# Patient Record
Sex: FEMALE | Race: WHITE | NOT HISPANIC OR LATINO | Employment: OTHER | URBAN - METROPOLITAN AREA
[De-identification: names, ages, dates, MRNs, and addresses within clinical notes are randomized per-mention and may not be internally consistent; named-entity substitution may affect disease eponyms.]

---

## 2018-03-02 ENCOUNTER — NEW REFERRAL (OUTPATIENT)
Dept: URBAN - METROPOLITAN AREA CLINIC 26 | Facility: CLINIC | Age: 78
End: 2018-03-02

## 2018-03-02 VITALS
WEIGHT: 165 LBS | HEIGHT: 68.5 IN | BODY MASS INDEX: 24.72 KG/M2 | DIASTOLIC BLOOD PRESSURE: 75 MMHG | HEART RATE: 70 BPM | SYSTOLIC BLOOD PRESSURE: 111 MMHG

## 2018-03-02 DIAGNOSIS — H35.371: ICD-10-CM

## 2018-03-02 DIAGNOSIS — H35.3132: ICD-10-CM

## 2018-03-02 DIAGNOSIS — H35.373: ICD-10-CM

## 2018-03-02 DIAGNOSIS — H40.1130: ICD-10-CM

## 2018-03-02 DIAGNOSIS — H02.056: ICD-10-CM

## 2018-03-02 PROCEDURE — 67820 REVISE EYELASHES: CPT

## 2018-03-02 PROCEDURE — 92235 FLUORESCEIN ANGRPH MLTIFRAME: CPT

## 2018-03-02 PROCEDURE — 92134 CPTRZ OPH DX IMG PST SGM RTA: CPT

## 2018-03-02 PROCEDURE — 99204 OFFICE O/P NEW MOD 45 MIN: CPT

## 2018-03-02 PROCEDURE — 92250 FUNDUS PHOTOGRAPHY W/I&R: CPT

## 2018-03-02 ASSESSMENT — TONOMETRY
OD_IOP_MMHG: 17
OS_IOP_MMHG: 16

## 2018-03-02 ASSESSMENT — VISUAL ACUITY
OS_CC: 20/30+1
OD_CC: 20/20-1

## 2018-12-18 ENCOUNTER — FOLLOW UP (OUTPATIENT)
Dept: URBAN - METROPOLITAN AREA CLINIC 26 | Facility: CLINIC | Age: 78
End: 2018-12-18

## 2018-12-18 VITALS — HEART RATE: 73 BPM | HEIGHT: 55 IN | SYSTOLIC BLOOD PRESSURE: 119 MMHG | DIASTOLIC BLOOD PRESSURE: 76 MMHG

## 2018-12-18 DIAGNOSIS — H35.373: ICD-10-CM

## 2018-12-18 DIAGNOSIS — H35.371: ICD-10-CM

## 2018-12-18 DIAGNOSIS — H40.1130: ICD-10-CM

## 2018-12-18 DIAGNOSIS — H35.3132: ICD-10-CM

## 2018-12-18 DIAGNOSIS — H02.056: ICD-10-CM

## 2018-12-18 PROCEDURE — 92014 COMPRE OPH EXAM EST PT 1/>: CPT

## 2018-12-18 PROCEDURE — 92250 FUNDUS PHOTOGRAPHY W/I&R: CPT

## 2018-12-18 PROCEDURE — 92235 FLUORESCEIN ANGRPH MLTIFRAME: CPT

## 2018-12-18 ASSESSMENT — TONOMETRY
OD_IOP_MMHG: 10
OS_IOP_MMHG: 10

## 2018-12-18 ASSESSMENT — VISUAL ACUITY
OS_CC: 20/25-1
OD_CC: 20/15

## 2019-12-17 ENCOUNTER — FOLLOW UP (OUTPATIENT)
Dept: URBAN - METROPOLITAN AREA CLINIC 26 | Facility: CLINIC | Age: 79
End: 2019-12-17

## 2019-12-17 VITALS
HEIGHT: 68.5 IN | DIASTOLIC BLOOD PRESSURE: 66 MMHG | SYSTOLIC BLOOD PRESSURE: 108 MMHG | BODY MASS INDEX: 23.97 KG/M2 | HEART RATE: 62 BPM | WEIGHT: 160 LBS

## 2019-12-17 DIAGNOSIS — H40.1130: ICD-10-CM

## 2019-12-17 DIAGNOSIS — H35.373: ICD-10-CM

## 2019-12-17 DIAGNOSIS — H35.3132: ICD-10-CM

## 2019-12-17 DIAGNOSIS — H02.056: ICD-10-CM

## 2019-12-17 DIAGNOSIS — H35.371: ICD-10-CM

## 2019-12-17 PROCEDURE — 92235 FLUORESCEIN ANGRPH MLTIFRAME: CPT

## 2019-12-17 PROCEDURE — 92014 COMPRE OPH EXAM EST PT 1/>: CPT

## 2019-12-17 PROCEDURE — 92250 FUNDUS PHOTOGRAPHY W/I&R: CPT

## 2019-12-17 ASSESSMENT — TONOMETRY
OS_IOP_MMHG: 12
OD_IOP_MMHG: 10

## 2019-12-17 ASSESSMENT — VISUAL ACUITY
OD_CC: 20/20-1
OS_CC: 20/30+1

## 2020-12-15 ENCOUNTER — FOLLOW UP (OUTPATIENT)
Dept: URBAN - METROPOLITAN AREA CLINIC 26 | Facility: CLINIC | Age: 80
End: 2020-12-15

## 2020-12-15 VITALS
DIASTOLIC BLOOD PRESSURE: 75 MMHG | HEART RATE: 70 BPM | BODY MASS INDEX: 24.72 KG/M2 | HEIGHT: 68.5 IN | WEIGHT: 165 LBS | SYSTOLIC BLOOD PRESSURE: 113 MMHG

## 2020-12-15 DIAGNOSIS — H35.3132: ICD-10-CM

## 2020-12-15 DIAGNOSIS — H02.056: ICD-10-CM

## 2020-12-15 DIAGNOSIS — H35.371: ICD-10-CM

## 2020-12-15 DIAGNOSIS — H40.1130: ICD-10-CM

## 2020-12-15 DIAGNOSIS — H35.373: ICD-10-CM

## 2020-12-15 PROCEDURE — 92235 FLUORESCEIN ANGRPH MLTIFRAME: CPT

## 2020-12-15 PROCEDURE — 92014 COMPRE OPH EXAM EST PT 1/>: CPT

## 2020-12-15 PROCEDURE — 92250 FUNDUS PHOTOGRAPHY W/I&R: CPT

## 2020-12-15 ASSESSMENT — TONOMETRY
OS_IOP_MMHG: 14
OD_IOP_MMHG: 16

## 2020-12-15 ASSESSMENT — VISUAL ACUITY
OS_CC: 20/30-2
OD_CC: 20/20-1

## 2021-01-01 ENCOUNTER — OFFICE VISIT (OUTPATIENT)
Age: 81
End: 2021-01-01

## 2021-01-20 ENCOUNTER — OFFICE VISIT (OUTPATIENT)
Dept: URBAN - METROPOLITAN AREA CLINIC 9 | Facility: CLINIC | Age: 81
End: 2021-01-20

## 2021-01-28 ENCOUNTER — OFFICE VISIT (OUTPATIENT)
Dept: URBAN - METROPOLITAN AREA SURGERY CENTER 9 | Facility: SURGERY CENTER | Age: 81
End: 2021-01-28

## 2021-02-01 ENCOUNTER — OFFICE VISIT (OUTPATIENT)
Dept: URBAN - METROPOLITAN AREA CLINIC 9 | Facility: CLINIC | Age: 81
End: 2021-02-01

## 2021-02-04 ENCOUNTER — TELEPHONE ENCOUNTER (OUTPATIENT)
Dept: URBAN - METROPOLITAN AREA CLINIC 9 | Facility: CLINIC | Age: 81
End: 2021-02-04

## 2021-02-12 ENCOUNTER — OFFICE VISIT (OUTPATIENT)
Dept: URBAN - METROPOLITAN AREA CLINIC 9 | Facility: CLINIC | Age: 81
End: 2021-02-12

## 2021-02-18 ENCOUNTER — OFFICE VISIT (OUTPATIENT)
Dept: URBAN - METROPOLITAN AREA CLINIC 9 | Facility: CLINIC | Age: 81
End: 2021-02-18

## 2021-02-23 ENCOUNTER — OFFICE VISIT (OUTPATIENT)
Dept: URBAN - METROPOLITAN AREA CLINIC 9 | Facility: CLINIC | Age: 81
End: 2021-02-23

## 2021-03-30 ENCOUNTER — OFFICE VISIT (OUTPATIENT)
Dept: URBAN - METROPOLITAN AREA CLINIC 9 | Facility: CLINIC | Age: 81
End: 2021-03-30

## 2021-04-01 ENCOUNTER — TELEPHONE ENCOUNTER (OUTPATIENT)
Dept: URBAN - METROPOLITAN AREA CLINIC 9 | Facility: CLINIC | Age: 81
End: 2021-04-01

## 2021-04-02 ENCOUNTER — LAB OUTSIDE AN ENCOUNTER (OUTPATIENT)
Age: 81
End: 2021-04-02

## 2021-04-02 ENCOUNTER — TELEPHONE ENCOUNTER (OUTPATIENT)
Dept: URBAN - METROPOLITAN AREA CLINIC 9 | Facility: CLINIC | Age: 81
End: 2021-04-02

## 2021-04-05 ENCOUNTER — TELEPHONE ENCOUNTER (OUTPATIENT)
Dept: URBAN - METROPOLITAN AREA CLINIC 9 | Facility: CLINIC | Age: 81
End: 2021-04-05

## 2021-04-07 LAB — C DIFFICILE TOXIN GENE NAA: NEGATIVE

## 2021-04-12 ENCOUNTER — TELEPHONE ENCOUNTER (OUTPATIENT)
Dept: URBAN - METROPOLITAN AREA CLINIC 9 | Facility: CLINIC | Age: 81
End: 2021-04-12

## 2021-04-22 ENCOUNTER — OFFICE VISIT (OUTPATIENT)
Dept: URBAN - METROPOLITAN AREA CLINIC 9 | Facility: CLINIC | Age: 81
End: 2021-04-22

## 2021-04-26 ENCOUNTER — OFFICE VISIT (OUTPATIENT)
Dept: URBAN - METROPOLITAN AREA CLINIC 9 | Facility: CLINIC | Age: 81
End: 2021-04-26

## 2021-04-28 ENCOUNTER — OFFICE VISIT (OUTPATIENT)
Dept: URBAN - METROPOLITAN AREA CLINIC 9 | Facility: CLINIC | Age: 81
End: 2021-04-28

## 2021-04-28 ENCOUNTER — TELEPHONE ENCOUNTER (OUTPATIENT)
Dept: URBAN - METROPOLITAN AREA CLINIC 9 | Facility: CLINIC | Age: 81
End: 2021-04-28

## 2021-04-30 ENCOUNTER — TELEPHONE ENCOUNTER (OUTPATIENT)
Dept: URBAN - METROPOLITAN AREA CLINIC 9 | Facility: CLINIC | Age: 81
End: 2021-04-30

## 2021-12-17 ENCOUNTER — FOLLOW UP (OUTPATIENT)
Dept: URBAN - METROPOLITAN AREA CLINIC 26 | Facility: CLINIC | Age: 81
End: 2021-12-17

## 2021-12-17 VITALS
HEIGHT: 68 IN | SYSTOLIC BLOOD PRESSURE: 107 MMHG | HEART RATE: 71 BPM | BODY MASS INDEX: 24.25 KG/M2 | DIASTOLIC BLOOD PRESSURE: 74 MMHG | WEIGHT: 160 LBS

## 2021-12-17 DIAGNOSIS — H04.123: ICD-10-CM

## 2021-12-17 DIAGNOSIS — H35.371: ICD-10-CM

## 2021-12-17 DIAGNOSIS — H35.372: ICD-10-CM

## 2021-12-17 DIAGNOSIS — H40.1130: ICD-10-CM

## 2021-12-17 DIAGNOSIS — H02.056: ICD-10-CM

## 2021-12-17 DIAGNOSIS — H35.3132: ICD-10-CM

## 2021-12-17 PROCEDURE — 92235 FLUORESCEIN ANGRPH MLTIFRAME: CPT

## 2021-12-17 PROCEDURE — 92014 COMPRE OPH EXAM EST PT 1/>: CPT

## 2021-12-17 PROCEDURE — 92134 CPTRZ OPH DX IMG PST SGM RTA: CPT

## 2021-12-17 ASSESSMENT — TONOMETRY
OS_IOP_MMHG: 10
OD_IOP_MMHG: 12

## 2021-12-17 ASSESSMENT — VISUAL ACUITY
OD_CC: 20/20-2
OS_CC: 20/30+2

## 2022-07-30 ENCOUNTER — TELEPHONE ENCOUNTER (OUTPATIENT)
Age: 82
End: 2022-07-30

## 2022-07-30 RX ORDER — SUCRALFATE 1 G/1
4 TABLET ORAL
Qty: 0 | Refills: 2 | OUTPATIENT
Start: 2021-02-18 | End: 2021-03-20

## 2022-07-30 RX ORDER — PANTOPRAZOLE SODIUM 40 MG/1
1 (ONE) TABLET, DELAYED RELEASE ORAL
Qty: 0 | Refills: 2 | OUTPATIENT
Start: 2021-02-18 | End: 2021-03-20

## 2022-07-30 RX ORDER — METRONIDAZOLE 500 MG/1
1 (ONE) TABLET ORAL
Qty: 0 | Refills: 2 | OUTPATIENT
Start: 2021-04-05 | End: 2021-04-12

## 2022-07-30 RX ORDER — FAMOTIDINE 20 MG/1
1-2 TABLET ORAL
Qty: 0 | Refills: 16 | OUTPATIENT
Start: 2020-01-31 | End: 2021-01-20

## 2022-07-30 RX ORDER — OMEPRAZOLE 10 MG/1
1 CAPSULE, DELAYED RELEASE ORAL DAILY
Qty: 0 | Refills: 2 | OUTPATIENT
Start: 2019-02-19 | End: 2019-02-19

## 2022-07-30 RX ORDER — SUCRALFATE 1 G/1
4 TABLET ORAL
Qty: 0 | Refills: 2 | OUTPATIENT
Start: 2021-03-30 | End: 2021-04-29

## 2022-07-30 RX ORDER — WHEAT DEXTRIN 3 G/4 G
1 (ONE) POWDER (GRAM) ORAL DAILY
Qty: 0 | Refills: 2 | OUTPATIENT
Start: 2020-01-31 | End: 2020-03-01

## 2022-07-30 RX ORDER — FAMOTIDINE 20 MG/1
1 (ONE) TABLET ORAL EVERY EVENING
Qty: 0 | Refills: 16 | OUTPATIENT
Start: 2021-02-18 | End: 2021-04-02

## 2022-07-30 RX ORDER — PANTOPRAZOLE SODIUM 40 MG/1
1 (ONE) TABLET, DELAYED RELEASE ORAL
Qty: 0 | Refills: 2 | OUTPATIENT
Start: 2021-03-30 | End: 2021-03-30

## 2022-07-31 ENCOUNTER — TELEPHONE ENCOUNTER (OUTPATIENT)
Age: 82
End: 2022-07-31

## 2022-07-31 RX ORDER — LORATADINE 5 MG
17 GRAMS TABLET,CHEWABLE ORAL DAILY
Qty: 0 | Refills: 16 | Status: ACTIVE | COMMUNITY
Start: 2020-01-31

## 2022-07-31 RX ORDER — SUCRALFATE 1 G/1
4 TABLET ORAL
Qty: 0 | Refills: 2 | Status: ACTIVE | COMMUNITY
Start: 2021-01-20

## 2022-07-31 RX ORDER — OMEPRAZOLE 10 MG/1
1 CAPSULE, DELAYED RELEASE ORAL DAILY
Qty: 0 | Refills: 2 | Status: ACTIVE | COMMUNITY
Start: 2019-01-30

## 2022-07-31 RX ORDER — METRONIDAZOLE 500 MG/1
1 (ONE) TABLET ORAL
Qty: 0 | Refills: 2 | Status: ACTIVE | COMMUNITY
Start: 2021-04-02

## 2022-07-31 RX ORDER — FAMOTIDINE 40 MG/1
AS TABLET, FILM COATED ORAL
Qty: 0 | Refills: 16 | Status: ACTIVE | COMMUNITY
Start: 2021-04-28

## 2022-07-31 RX ORDER — METRONIDAZOLE 500 MG/1
1 (ONE) TABLET ORAL
Qty: 0 | Refills: 2 | Status: ACTIVE | COMMUNITY
Start: 2021-04-05

## 2022-07-31 RX ORDER — SUCRALFATE 1 G/1
4 TABLET ORAL
Qty: 0 | Refills: 2 | Status: ACTIVE | COMMUNITY
Start: 2021-03-30

## 2022-07-31 RX ORDER — PANTOPRAZOLE SODIUM 40 MG/1
1 (ONE) TABLET, DELAYED RELEASE ORAL
Qty: 0 | Refills: 2 | Status: ACTIVE | COMMUNITY
Start: 2021-02-18

## 2022-07-31 RX ORDER — WHEAT DEXTRIN 3 G/4 G
1 (ONE) POWDER (GRAM) ORAL DAILY
Qty: 0 | Refills: 2 | Status: ACTIVE | COMMUNITY
Start: 2020-01-31

## 2022-07-31 RX ORDER — LORATADINE 5 MG
17 GRAMS TABLET,CHEWABLE ORAL DAILY
Qty: 0 | Refills: 16 | Status: ACTIVE | COMMUNITY
Start: 2021-01-20

## 2022-07-31 RX ORDER — SUCRALFATE 1 G/1
4 TABLET ORAL
Qty: 0 | Refills: 2 | Status: ACTIVE | COMMUNITY
Start: 2021-02-18

## 2022-07-31 RX ORDER — LORATADINE 5 MG
17 GRAMS TABLET,CHEWABLE ORAL DAILY
Qty: 0 | Refills: 16 | Status: ACTIVE | COMMUNITY
Start: 2021-04-28

## 2022-07-31 RX ORDER — PANTOPRAZOLE SODIUM 40 MG/1
1 (ONE) TABLET, DELAYED RELEASE ORAL
Qty: 0 | Refills: 2 | Status: ACTIVE | COMMUNITY
Start: 2021-01-20

## 2022-07-31 RX ORDER — FAMOTIDINE 20 MG/1
1 (ONE) TABLET ORAL EVERY EVENING
Qty: 0 | Refills: 16 | Status: ACTIVE | COMMUNITY
Start: 2021-02-18

## 2022-07-31 RX ORDER — FAMOTIDINE 20 MG/1
1-2 TABLET ORAL
Qty: 0 | Refills: 16 | Status: ACTIVE | COMMUNITY
Start: 2020-01-31

## 2022-07-31 RX ORDER — FAMOTIDINE 20 MG/1
1-2 TABLET ORAL
Qty: 0 | Refills: 16 | Status: ACTIVE | COMMUNITY
Start: 2019-02-19

## 2022-07-31 RX ORDER — LORATADINE 5 MG
17 GRAMS TABLET,CHEWABLE ORAL DAILY
Qty: 0 | Refills: 16 | Status: ACTIVE | COMMUNITY
Start: 2021-02-18

## 2022-07-31 RX ORDER — FAMOTIDINE 40 MG/1
1 (ONE) TABLET, FILM COATED ORAL TWICE A DAY
Qty: 0 | Refills: 16 | Status: ACTIVE | COMMUNITY
Start: 2021-04-05

## 2022-07-31 RX ORDER — FAMOTIDINE 40 MG/1
1 (ONE) TABLET, FILM COATED ORAL TWICE A DAY
Qty: 0 | Refills: 16 | Status: ACTIVE | COMMUNITY
Start: 2021-03-30

## 2022-12-09 ENCOUNTER — FOLLOW UP (OUTPATIENT)
Dept: URBAN - METROPOLITAN AREA CLINIC 26 | Facility: CLINIC | Age: 82
End: 2022-12-09

## 2022-12-09 VITALS
HEART RATE: 72 BPM | HEIGHT: 68 IN | WEIGHT: 160 LBS | DIASTOLIC BLOOD PRESSURE: 70 MMHG | SYSTOLIC BLOOD PRESSURE: 100 MMHG | BODY MASS INDEX: 24.25 KG/M2

## 2022-12-09 PROCEDURE — 92014 COMPRE OPH EXAM EST PT 1/>: CPT

## 2022-12-09 PROCEDURE — 92250 FUNDUS PHOTOGRAPHY W/I&R: CPT

## 2022-12-09 PROCEDURE — 92235 FLUORESCEIN ANGRPH MLTIFRAME: CPT

## 2022-12-09 PROCEDURE — 92134 CPTRZ OPH DX IMG PST SGM RTA: CPT

## 2022-12-09 ASSESSMENT — VISUAL ACUITY
OS_CC: 20/40-2
OD_CC: 20/25+2

## 2022-12-09 ASSESSMENT — TONOMETRY
OD_IOP_MMHG: 12
OS_IOP_MMHG: 10

## 2023-04-04 ENCOUNTER — WEB ENCOUNTER (OUTPATIENT)
Dept: URBAN - METROPOLITAN AREA CLINIC 9 | Facility: CLINIC | Age: 83
End: 2023-04-04

## 2023-04-04 ENCOUNTER — OFFICE VISIT (OUTPATIENT)
Dept: URBAN - METROPOLITAN AREA CLINIC 9 | Facility: CLINIC | Age: 83
End: 2023-04-04
Payer: MEDICARE

## 2023-04-04 VITALS
HEIGHT: 68 IN | BODY MASS INDEX: 25.46 KG/M2 | DIASTOLIC BLOOD PRESSURE: 76 MMHG | WEIGHT: 168 LBS | SYSTOLIC BLOOD PRESSURE: 124 MMHG

## 2023-04-04 DIAGNOSIS — K21.9 GASTROESOPHAGEAL REFLUX DISEASE WITHOUT ESOPHAGITIS: ICD-10-CM

## 2023-04-04 DIAGNOSIS — K58.1 IRRITABLE BOWEL SYNDROME WITH CONSTIPATION: ICD-10-CM

## 2023-04-04 DIAGNOSIS — R19.4 CHANGE IN BOWEL HABIT: ICD-10-CM

## 2023-04-04 PROCEDURE — 99214 OFFICE O/P EST MOD 30 MIN: CPT | Performed by: INTERNAL MEDICINE

## 2023-04-04 RX ORDER — PANTOPRAZOLE SODIUM 40 MG/1
1 (ONE) TABLET, DELAYED RELEASE ORAL
Qty: 0 | Refills: 2 | Status: ACTIVE | COMMUNITY
Start: 2021-02-18

## 2023-04-04 RX ORDER — SUCRALFATE 1 G/1
4 TABLET ORAL
Qty: 0 | Refills: 2 | Status: ON HOLD | COMMUNITY
Start: 2021-03-30

## 2023-04-04 RX ORDER — DORZOLAMIDE HCL 20 MG/ML
1 DROP INTO AFFECTED EYE SOLUTION/ DROPS OPHTHALMIC THREE TIMES A DAY
Status: ACTIVE | COMMUNITY

## 2023-04-04 RX ORDER — WHEAT DEXTRIN 3 G/4 G
1 TABLET POWDER (GRAM) ORAL TWICE A DAY
Qty: 60 TABLET | Refills: 3 | OUTPATIENT
Start: 2020-01-31 | End: 2023-08-02

## 2023-04-04 RX ORDER — FAMOTIDINE 20 MG/1
1-2 TABLET ORAL
Qty: 0 | Refills: 16 | Status: ON HOLD | COMMUNITY
Start: 2019-02-19

## 2023-04-04 RX ORDER — WHEAT DEXTRIN 3 G/4 G
1 (ONE) POWDER (GRAM) ORAL DAILY
Qty: 0 | Refills: 2 | Status: ON HOLD | COMMUNITY
Start: 2020-01-31

## 2023-04-04 RX ORDER — PANTOPRAZOLE SODIUM 40 MG/1
1 (ONE) TABLET, DELAYED RELEASE ORAL
OUTPATIENT
Start: 2021-02-18

## 2023-04-04 RX ORDER — LORATADINE 5 MG
17 GRAMS TABLET,CHEWABLE ORAL DAILY
Qty: 0 | Refills: 16 | Status: ON HOLD | COMMUNITY
Start: 2021-01-20

## 2023-04-04 RX ORDER — METRONIDAZOLE 500 MG/1
1 (ONE) TABLET ORAL
Qty: 0 | Refills: 2 | Status: ON HOLD | COMMUNITY
Start: 2021-04-05

## 2023-04-04 RX ORDER — OMEPRAZOLE 10 MG/1
1 CAPSULE, DELAYED RELEASE ORAL DAILY
Qty: 0 | Refills: 2 | Status: ON HOLD | COMMUNITY
Start: 2019-01-30

## 2023-04-04 RX ORDER — FAMOTIDINE 40 MG/1
1 (ONE) TABLET, FILM COATED ORAL TWICE A DAY
Qty: 0 | Refills: 16 | Status: ON HOLD | COMMUNITY
Start: 2021-03-30

## 2023-04-04 NOTE — HPI-TODAY'S VISIT:
Pt here for eval of new onset FI and bowel changes for the past year that are new. . Pt EGD 15 years ago with hiatal hernia. EGD 1/2019 with HP neg gastritis EGD 2/2021 with Hp neg gastritis, no hernia/ulcer/cancer . last colonoscopy clear in 2015, pt avg risk . 3/2023 CBC, BMP, lfts normal . Failed pepcid Failed omeprazole 20 mg OTC Did well on protonix every Am and pepcid QHS . Pt here for evaluation of new bowel changes. She hasnt had a colonoscopy since 2015. She is on benefiber and still having FI and new onset bowel changes despite fiber. She has not had a colonoscopy and I advised we plan on colon as it has been 8 years, there are bowel changes and plan on bx. Cont benefiber for now but increase to BID.  . RTC 2 months.

## 2023-10-31 ENCOUNTER — TELEPHONE ENCOUNTER (OUTPATIENT)
Dept: URBAN - METROPOLITAN AREA CLINIC 7 | Facility: CLINIC | Age: 83
End: 2023-10-31

## 2024-01-16 ENCOUNTER — OFFICE VISIT (OUTPATIENT)
Dept: URBAN - METROPOLITAN AREA CLINIC 9 | Facility: CLINIC | Age: 84
End: 2024-01-16
Payer: MEDICARE

## 2024-01-16 ENCOUNTER — DASHBOARD ENCOUNTERS (OUTPATIENT)
Age: 84
End: 2024-01-16

## 2024-01-16 VITALS
DIASTOLIC BLOOD PRESSURE: 84 MMHG | HEIGHT: 68 IN | BODY MASS INDEX: 26.07 KG/M2 | WEIGHT: 172 LBS | SYSTOLIC BLOOD PRESSURE: 132 MMHG

## 2024-01-16 DIAGNOSIS — R19.4 CHANGE IN BOWEL HABIT: ICD-10-CM

## 2024-01-16 DIAGNOSIS — K21.9 GASTROESOPHAGEAL REFLUX DISEASE WITHOUT ESOPHAGITIS: ICD-10-CM

## 2024-01-16 PROCEDURE — 99214 OFFICE O/P EST MOD 30 MIN: CPT | Performed by: INTERNAL MEDICINE

## 2024-01-16 RX ORDER — FAMOTIDINE 20 MG/1
1-2 TABLET ORAL
Qty: 0 | Refills: 16 | Status: ON HOLD | COMMUNITY
Start: 2019-02-19

## 2024-01-16 RX ORDER — WHEAT DEXTRIN 3 G/4 G
1 TABLET POWDER (GRAM) ORAL TWICE A DAY
OUTPATIENT
Start: 2020-01-31

## 2024-01-16 RX ORDER — OMEPRAZOLE 10 MG/1
1 CAPSULE, DELAYED RELEASE ORAL DAILY
Qty: 0 | Refills: 2 | Status: ON HOLD | COMMUNITY
Start: 2019-01-30

## 2024-01-16 RX ORDER — PANTOPRAZOLE SODIUM 40 MG/1
1 (ONE) TABLET, DELAYED RELEASE ORAL
Status: ACTIVE | COMMUNITY
Start: 2021-02-18

## 2024-01-16 RX ORDER — DORZOLAMIDE HCL 20 MG/ML
1 DROP INTO AFFECTED EYE SOLUTION/ DROPS OPHTHALMIC THREE TIMES A DAY
Status: ACTIVE | COMMUNITY

## 2024-01-16 RX ORDER — FAMOTIDINE 40 MG/1
1 (ONE) TABLET, FILM COATED ORAL TWICE A DAY
Qty: 0 | Refills: 16 | Status: ON HOLD | COMMUNITY
Start: 2021-03-30

## 2024-01-16 RX ORDER — LORATADINE 5 MG
17 GRAMS TABLET,CHEWABLE ORAL DAILY
Qty: 0 | Refills: 16 | Status: ON HOLD | COMMUNITY
Start: 2021-01-20

## 2024-01-16 RX ORDER — METRONIDAZOLE 500 MG/1
1 (ONE) TABLET ORAL
Qty: 0 | Refills: 2 | Status: ON HOLD | COMMUNITY
Start: 2021-04-05

## 2024-01-16 RX ORDER — PANTOPRAZOLE SODIUM 40 MG/1
1 (ONE) TABLET, DELAYED RELEASE ORAL
OUTPATIENT
Start: 2021-02-18

## 2024-01-16 RX ORDER — SUCRALFATE 1 G/1
4 TABLET ORAL
Qty: 0 | Refills: 2 | Status: ON HOLD | COMMUNITY
Start: 2021-03-30

## 2024-01-16 NOTE — HPI-TODAY'S VISIT:
Pt here for f/u of diarrhea and fecal incontinence . Pt EGD 15 years ago with hiatal hernia. last colonoscopy clear in 2015, pt avg risk EGD 1/2019 with HP neg gastritis EGD 2/2021 with Hp neg gastritis, no hernia/ulcer/cancer . 3/2023 CBC, BMP, lfts normal . Failed pepcid Failed omeprazole 20 mg OTC Did well on protonix every Am and pepcid QHS . Pt here for f/u. We last saw her in 3/2023 and we increased fiber to bid and advised colonoscopy. She never scheduled the colon as she was improved. She now has relapsed but had reduced her fiber to once per day. She now has increased to BID in the past week and is partially improved. No bleeding, no weight loss. We again discussed a colon and the r/b/a. She will consider a colon and will schedule. She again understands the risks of colon cancer or a polyp or colitis. .

## 2024-02-14 ENCOUNTER — OV EP (OUTPATIENT)
Dept: URBAN - METROPOLITAN AREA CLINIC 9 | Facility: CLINIC | Age: 84
End: 2024-02-14

## 2024-03-04 ENCOUNTER — COLON (OUTPATIENT)
Dept: URBAN - METROPOLITAN AREA SURGERY CENTER 9 | Facility: SURGERY CENTER | Age: 84
End: 2024-03-04
Payer: MEDICARE

## 2024-03-04 DIAGNOSIS — Z53.8 PROCEDURE AND TREATMENT NOT CARRIED OUT FOR OTHER REASONS: ICD-10-CM

## 2024-03-04 DIAGNOSIS — K64.8 OTHER HEMORRHOIDS: ICD-10-CM

## 2024-03-04 DIAGNOSIS — K57.30 DIVERTICULOSIS OF LARGE INTESTINE WITHOUT PERFORATION OR ABSCESS WITHOUT BLEEDING: ICD-10-CM

## 2024-03-04 DIAGNOSIS — K56.699 COLON STRICTURE: ICD-10-CM

## 2024-03-04 PROCEDURE — 45378 DIAGNOSTIC COLONOSCOPY: CPT | Performed by: INTERNAL MEDICINE

## 2024-03-04 RX ORDER — SUCRALFATE 1 G/1
4 TABLET ORAL
Qty: 0 | Refills: 2 | Status: ON HOLD | COMMUNITY
Start: 2021-03-30

## 2024-03-04 RX ORDER — FAMOTIDINE 20 MG/1
1-2 TABLET ORAL
Qty: 0 | Refills: 16 | Status: ON HOLD | COMMUNITY
Start: 2019-02-19

## 2024-03-04 RX ORDER — OMEPRAZOLE 10 MG/1
1 CAPSULE, DELAYED RELEASE ORAL DAILY
Qty: 0 | Refills: 2 | Status: ON HOLD | COMMUNITY
Start: 2019-01-30

## 2024-03-04 RX ORDER — FAMOTIDINE 40 MG/1
1 (ONE) TABLET, FILM COATED ORAL TWICE A DAY
Qty: 0 | Refills: 16 | Status: ON HOLD | COMMUNITY
Start: 2021-03-30

## 2024-03-04 RX ORDER — PANTOPRAZOLE SODIUM 40 MG/1
1 (ONE) TABLET, DELAYED RELEASE ORAL
Status: ACTIVE | COMMUNITY
Start: 2021-02-18

## 2024-03-04 RX ORDER — LORATADINE 5 MG
17 GRAMS TABLET,CHEWABLE ORAL DAILY
Qty: 0 | Refills: 16 | Status: ON HOLD | COMMUNITY
Start: 2021-01-20

## 2024-03-04 RX ORDER — METRONIDAZOLE 500 MG/1
1 (ONE) TABLET ORAL
Qty: 0 | Refills: 2 | Status: ON HOLD | COMMUNITY
Start: 2021-04-05

## 2024-03-04 RX ORDER — DORZOLAMIDE HCL 20 MG/ML
1 DROP INTO AFFECTED EYE SOLUTION/ DROPS OPHTHALMIC THREE TIMES A DAY
Status: ACTIVE | COMMUNITY

## 2024-03-04 RX ORDER — WHEAT DEXTRIN 3 G/4 G
1 TABLET POWDER (GRAM) ORAL TWICE A DAY
Status: ACTIVE | COMMUNITY
Start: 2020-01-31

## 2024-03-05 ENCOUNTER — LAB (OUTPATIENT)
Dept: URBAN - METROPOLITAN AREA CLINIC 7 | Facility: CLINIC | Age: 84
End: 2024-03-05

## 2024-03-07 ENCOUNTER — FOLLOW UP (OUTPATIENT)
Dept: URBAN - METROPOLITAN AREA CLINIC 26 | Facility: CLINIC | Age: 84
End: 2024-03-07

## 2024-03-07 VITALS — HEIGHT: 68 IN | WEIGHT: 165 LBS | BODY MASS INDEX: 25.01 KG/M2

## 2024-03-07 DIAGNOSIS — H35.3112: ICD-10-CM

## 2024-03-07 DIAGNOSIS — H35.371: ICD-10-CM

## 2024-03-07 DIAGNOSIS — H02.056: ICD-10-CM

## 2024-03-07 DIAGNOSIS — H35.373: ICD-10-CM

## 2024-03-07 DIAGNOSIS — H04.123: ICD-10-CM

## 2024-03-07 DIAGNOSIS — H40.1130: ICD-10-CM

## 2024-03-07 PROCEDURE — 92250 FUNDUS PHOTOGRAPHY W/I&R: CPT

## 2024-03-07 PROCEDURE — 92134 CPTRZ OPH DX IMG PST SGM RTA: CPT

## 2024-03-07 PROCEDURE — 92014 COMPRE OPH EXAM EST PT 1/>: CPT

## 2024-03-07 ASSESSMENT — TONOMETRY
OS_IOP_MMHG: 11
OD_IOP_MMHG: 11

## 2024-03-07 ASSESSMENT — VISUAL ACUITY
OS_CC: 20/60-2
OD_CC: 20/25+1

## 2024-04-04 ENCOUNTER — FOLLOW UP (OUTPATIENT)
Dept: URBAN - METROPOLITAN AREA CLINIC 26 | Facility: CLINIC | Age: 84
End: 2024-04-04

## 2024-04-04 DIAGNOSIS — H02.056: ICD-10-CM

## 2024-04-04 DIAGNOSIS — H35.3112: ICD-10-CM

## 2024-04-04 DIAGNOSIS — H40.1130: ICD-10-CM

## 2024-04-04 DIAGNOSIS — H35.3123: ICD-10-CM

## 2024-04-04 DIAGNOSIS — H35.373: ICD-10-CM

## 2024-04-04 DIAGNOSIS — H04.123: ICD-10-CM

## 2024-04-04 DIAGNOSIS — H35.371: ICD-10-CM

## 2024-04-04 PROCEDURE — 92012 INTRM OPH EXAM EST PATIENT: CPT

## 2024-04-04 PROCEDURE — 92134 CPTRZ OPH DX IMG PST SGM RTA: CPT

## 2024-05-01 ENCOUNTER — TELEPHONE ENCOUNTER (OUTPATIENT)
Dept: URBAN - METROPOLITAN AREA CLINIC 9 | Facility: CLINIC | Age: 84
End: 2024-05-01

## 2024-05-22 ENCOUNTER — OFFICE VISIT (OUTPATIENT)
Dept: URBAN - METROPOLITAN AREA CLINIC 9 | Facility: CLINIC | Age: 84
End: 2024-05-22

## 2025-01-21 ENCOUNTER — COMPREHENSIVE EXAM (OUTPATIENT)
Age: 85
End: 2025-01-21

## 2025-01-21 VITALS — HEIGHT: 68 IN | BODY MASS INDEX: 25.76 KG/M2 | WEIGHT: 170 LBS

## 2025-01-21 DIAGNOSIS — H35.373: ICD-10-CM

## 2025-01-21 DIAGNOSIS — H35.3133: ICD-10-CM

## 2025-01-21 DIAGNOSIS — H02.056: ICD-10-CM

## 2025-01-21 DIAGNOSIS — H04.123: ICD-10-CM

## 2025-01-21 DIAGNOSIS — H40.1130: ICD-10-CM

## 2025-01-21 DIAGNOSIS — H35.371: ICD-10-CM

## 2025-01-21 DIAGNOSIS — H35.3112: ICD-10-CM

## 2025-01-21 PROCEDURE — 76514 ECHO EXAM OF EYE THICKNESS: CPT

## 2025-01-21 PROCEDURE — 92014 COMPRE OPH EXAM EST PT 1/>: CPT

## 2025-01-21 PROCEDURE — 92250 FUNDUS PHOTOGRAPHY W/I&R: CPT | Mod: 59

## 2025-01-21 PROCEDURE — 92134 CPTRZ OPH DX IMG PST SGM RTA: CPT

## 2025-01-29 ENCOUNTER — CLINIC PROCEDURE ONLY (OUTPATIENT)
Age: 85
End: 2025-01-29

## 2025-01-29 DIAGNOSIS — H35.3133: ICD-10-CM

## 2025-01-29 PROCEDURE — 67028 INJECTION EYE DRUG: CPT

## 2025-02-05 ENCOUNTER — CLINIC PROCEDURE ONLY (OUTPATIENT)
Age: 85
End: 2025-02-05

## 2025-02-05 DIAGNOSIS — H35.3133: ICD-10-CM

## 2025-02-05 PROCEDURE — 67028 INJECTION EYE DRUG: CPT

## 2025-03-04 ENCOUNTER — CLINIC PROCEDURE ONLY (OUTPATIENT)
Age: 85
End: 2025-03-04

## 2025-03-04 DIAGNOSIS — H35.3133: ICD-10-CM

## 2025-03-04 PROCEDURE — 92134 CPTRZ OPH DX IMG PST SGM RTA: CPT

## 2025-03-04 PROCEDURE — 67028 INJECTION EYE DRUG: CPT

## 2025-03-10 ENCOUNTER — CLINIC PROCEDURE ONLY (OUTPATIENT)
Age: 85
End: 2025-03-10

## 2025-03-10 DIAGNOSIS — H35.3133: ICD-10-CM

## 2025-03-10 PROCEDURE — 67028 INJECTION EYE DRUG: CPT

## 2025-03-10 PROCEDURE — 92250 FUNDUS PHOTOGRAPHY W/I&R: CPT

## 2025-04-09 ENCOUNTER — CLINIC PROCEDURE ONLY (OUTPATIENT)
Age: 85
End: 2025-04-09

## 2025-04-09 DIAGNOSIS — H35.3133: ICD-10-CM

## 2025-04-09 PROCEDURE — 92134 CPTRZ OPH DX IMG PST SGM RTA: CPT

## 2025-04-09 PROCEDURE — 67028 INJECTION EYE DRUG: CPT

## 2025-04-16 ENCOUNTER — CLINIC PROCEDURE ONLY (OUTPATIENT)
Age: 85
End: 2025-04-16

## 2025-04-16 DIAGNOSIS — H35.3133: ICD-10-CM

## 2025-04-16 PROCEDURE — 92250 FUNDUS PHOTOGRAPHY W/I&R: CPT

## 2025-04-16 PROCEDURE — 67028 INJECTION EYE DRUG: CPT
